# Patient Record
Sex: FEMALE | Race: WHITE
[De-identification: names, ages, dates, MRNs, and addresses within clinical notes are randomized per-mention and may not be internally consistent; named-entity substitution may affect disease eponyms.]

---

## 2017-05-03 ENCOUNTER — HOSPITAL ENCOUNTER (OUTPATIENT)
Dept: HOSPITAL 53 - M SFHCLERA | Age: 32
End: 2017-05-03
Attending: NURSE PRACTITIONER
Payer: COMMERCIAL

## 2017-05-03 DIAGNOSIS — R30.0: Primary | ICD-10-CM

## 2017-05-03 LAB — INTERNAL CH/GC CONTROL: (no result)

## 2017-05-13 ENCOUNTER — HOSPITAL ENCOUNTER (OUTPATIENT)
Dept: HOSPITAL 53 - M LAB | Age: 32
End: 2017-05-13
Attending: PHYSICIAN ASSISTANT
Payer: COMMERCIAL

## 2017-05-13 DIAGNOSIS — R53.83: ICD-10-CM

## 2017-05-13 DIAGNOSIS — D64.9: Primary | ICD-10-CM

## 2017-05-13 LAB
ALBUMIN SERPL BCG-MCNC: 4.1 GM/DL (ref 3.2–5.2)
ALBUMIN/GLOB SERPL: 1.17 {RATIO} (ref 1–1.93)
ALP SERPL-CCNC: 40 U/L (ref 45–117)
ALT SERPL W P-5'-P-CCNC: 17 U/L (ref 12–78)
ANION GAP SERPL CALC-SCNC: 7 MEQ/L (ref 8–16)
AST SERPL-CCNC: 12 U/L (ref 15–37)
BASOPHILS # BLD AUTO: 0.1 K/MM3 (ref 0–0.2)
BASOPHILS NFR BLD AUTO: 1.1 % (ref 0–1)
BILIRUB SERPL-MCNC: 0.4 MG/DL (ref 0.2–1)
BUN SERPL-MCNC: 12 MG/DL (ref 7–18)
CALCIUM SERPL-MCNC: 8.8 MG/DL (ref 8.5–10.1)
CHLORIDE SERPL-SCNC: 105 MEQ/L (ref 98–107)
CO2 SERPL-SCNC: 29 MEQ/L (ref 21–32)
CREAT SERPL-MCNC: 0.86 MG/DL (ref 0.55–1.02)
EOSINOPHIL # BLD AUTO: 0 K/MM3 (ref 0–0.5)
EOSINOPHIL NFR BLD AUTO: 1.1 % (ref 0–3)
ERYTHROCYTE [DISTWIDTH] IN BLOOD BY AUTOMATED COUNT: 12.2 % (ref 11.5–14.5)
GFR SERPL CREATININE-BSD FRML MDRD: > 60 ML/MIN/{1.73_M2} (ref 60–?)
GLUCOSE SERPL-MCNC: 76 MG/DL (ref 70–105)
LARGE UNSTAINED CELL #: 0.1 K/MM3 (ref 0–0.4)
LARGE UNSTAINED CELL %: 1.6 % (ref 0–4)
LYMPHOCYTES # BLD AUTO: 1.4 K/MM3 (ref 1.5–6.5)
LYMPHOCYTES NFR BLD AUTO: 28.1 % (ref 24–44)
MCH RBC QN AUTO: 32.7 PG (ref 27–33)
MCHC RBC AUTO-ENTMCNC: 33.7 G/DL (ref 32–36.5)
MCV RBC AUTO: 97 FL (ref 80–96)
MONOCYTES # BLD AUTO: 0.3 K/MM3 (ref 0–0.8)
MONOCYTES NFR BLD AUTO: 5 % (ref 0–5)
NEUTROPHILS # BLD AUTO: 3.2 K/MM3 (ref 1.8–7.7)
NEUTROPHILS NFR BLD AUTO: 63 % (ref 36–66)
PLATELET # BLD AUTO: 179 K/MM3 (ref 150–450)
POTASSIUM SERPL-SCNC: 4.6 MEQ/L (ref 3.5–5.1)
PROT SERPL-MCNC: 7.6 GM/DL (ref 6.4–8.2)
SODIUM SERPL-SCNC: 141 MEQ/L (ref 136–145)
WBC # BLD AUTO: 5 K/MM3 (ref 4–10)

## 2017-09-26 ENCOUNTER — HOSPITAL ENCOUNTER (OUTPATIENT)
Dept: HOSPITAL 53 - M LAB | Age: 32
End: 2017-09-26
Attending: ADVANCED PRACTICE MIDWIFE
Payer: COMMERCIAL

## 2017-09-26 DIAGNOSIS — Z34.81: Primary | ICD-10-CM

## 2017-09-26 LAB
BASOPHILS # BLD AUTO: 0 10^3/UL (ref 0–0.2)
BASOPHILS NFR BLD AUTO: 0.6 % (ref 0–1)
EOSINOPHIL # BLD AUTO: 0 10^3/UL (ref 0–0.5)
EOSINOPHIL NFR BLD AUTO: 0.6 % (ref 0–3)
ERYTHROCYTE [DISTWIDTH] IN BLOOD BY AUTOMATED COUNT: 11.9 % (ref 11.5–14.5)
IMM GRANULOCYTES NFR BLD: 0.3 % (ref 0–0)
LYMPHOCYTES # BLD AUTO: 1.5 10^3/UL (ref 1.5–6.5)
LYMPHOCYTES NFR BLD AUTO: 22.3 % (ref 24–44)
MCH RBC QN AUTO: 31.9 PG (ref 27–33)
MCHC RBC AUTO-ENTMCNC: 33.8 G/DL (ref 32–36.5)
MCV RBC AUTO: 94.5 FL (ref 80–96)
MONOCYTES # BLD AUTO: 0.4 10^3/UL (ref 0–0.8)
MONOCYTES NFR BLD AUTO: 6.2 % (ref 0–5)
NEUTROPHILS # BLD AUTO: 4.7 10^3/UL (ref 1.8–7.7)
NEUTROPHILS NFR BLD AUTO: 70 % (ref 36–66)
NRBC BLD AUTO-RTO: 0 % (ref 0–0)
PLATELET # BLD AUTO: 170 10^3/UL (ref 150–450)
WBC # BLD AUTO: 6.7 10^3/UL (ref 4–10)

## 2017-09-27 LAB — HBSAG PRENATAL: NEGATIVE

## 2017-12-08 ENCOUNTER — HOSPITAL ENCOUNTER (OUTPATIENT)
Dept: HOSPITAL 53 - M LAB | Age: 32
End: 2017-12-08
Attending: ADVANCED PRACTICE MIDWIFE
Payer: COMMERCIAL

## 2017-12-08 DIAGNOSIS — Z31.438: Primary | ICD-10-CM

## 2017-12-21 ENCOUNTER — HOSPITAL ENCOUNTER (OUTPATIENT)
Dept: HOSPITAL 53 - M RAD | Age: 32
End: 2017-12-21
Attending: ADVANCED PRACTICE MIDWIFE
Payer: COMMERCIAL

## 2017-12-21 DIAGNOSIS — Z3A.19: ICD-10-CM

## 2017-12-21 DIAGNOSIS — Z34.82: Primary | ICD-10-CM

## 2017-12-22 NOTE — REP
COMPLETE OBSTETRICAL ULTRASOUND:

 

CLINICAL:  Anatomical evaluation.

 

COMPARISON:  None.

 

FINDINGS:  Ultrasound examination demonstrates single live intrauterine pregnancy

in transverse lie with head towards the maternal left side.  Fetal motion was

identified by technologist.  Placenta is noted anteriorly and grade 0 without

evidence for placenta previa or abruption.  Amniotic fluid volume is within

normal limits.  Cervix measures 5.5 cm in length and appears closed.

 

Gestational age by LMP and current measurements 19 weeks 5 days with estimated

date of delivery 05/12/2018.

 

 beats per minute.

 

BPD 4.8 cm 20 weeks 3 days

 

HC 17.2 cm = 19 weeks 6 days

 

AC 14.5 cm 19 weeks 6 days

 

FL 3.1 cm 19 weeks 5 days

 

HL 3.1 cm 20 weeks 3 days

 

HC/AC ratio 1.19.  Estimated fetal weight 350 grams (51st percentile).

 

Anatomical assessment demonstrates normal cranium, choroid plexus, cavum,

cerebellum/posterior fossa, facial features, lungs, four chamber

heart/ventricular outflow tracts, diaphragm, stomach, cord insertion/three vessel

cord, kidneys/bladder and extremities.  Suboptimal evaluation of the spine due to

positioning.

 

IMPRESSION:

 

Single live intrauterine pregnancy in transverse lie.  Limited evaluation of the

spine may warrant re-evaluation.  Remainder of the anatomical assessment is

complete and normal.  No gross abnormalities are identified.

 

 

Signed by

Ryan Luis MD 12/23/2017 08:43 A

## 2018-01-25 ENCOUNTER — HOSPITAL ENCOUNTER (OUTPATIENT)
Dept: HOSPITAL 53 - M RAD | Age: 33
End: 2018-01-25
Attending: ADVANCED PRACTICE MIDWIFE
Payer: COMMERCIAL

## 2018-01-25 DIAGNOSIS — Z34.82: Primary | ICD-10-CM

## 2018-01-25 DIAGNOSIS — Z3A.24: ICD-10-CM

## 2018-01-25 PROCEDURE — 76816 OB US FOLLOW-UP PER FETUS: CPT

## 2018-02-16 ENCOUNTER — HOSPITAL ENCOUNTER (OUTPATIENT)
Dept: HOSPITAL 53 - M SMT | Age: 33
End: 2018-02-16
Attending: ADVANCED PRACTICE MIDWIFE
Payer: COMMERCIAL

## 2018-02-16 DIAGNOSIS — Z34.82: Primary | ICD-10-CM

## 2018-02-16 LAB
GLUCOSE CHALLENGE TEST 1 HOUR: 128 MG/DL (ref ?–140)
HEMATOCRIT: 36.7 % (ref 36–47)
HEMOGLOBIN: 12.4 G/DL (ref 12–16)
MEAN CORPUSCULAR HEMOGLOBIN: 33.3 PG (ref 27–33)
MEAN CORPUSCULAR HGB CONC: 33.8 G/DL (ref 32–36.5)
MEAN CORPUSCULAR VOLUME: 98.7 FL (ref 80–96)
NRBC BLD AUTO-RTO: 0 % (ref 0–0)
PLATELET COUNT, AUTOMATED: 157 10^3/UL (ref 150–450)
RED BLOOD COUNT: 3.72 10^6/UL (ref 4–5.4)
RED CELL DISTRIBUTION WIDTH: 12.2 % (ref 11.5–14.5)
WHITE BLOOD COUNT: 8 10^3/UL (ref 4–10)

## 2018-04-18 ENCOUNTER — HOSPITAL ENCOUNTER (OUTPATIENT)
Dept: HOSPITAL 53 - M LAB REF | Age: 33
End: 2018-04-18
Attending: ADVANCED PRACTICE MIDWIFE
Payer: COMMERCIAL

## 2018-04-18 DIAGNOSIS — Z34.83: Primary | ICD-10-CM

## 2018-04-18 PROCEDURE — 87081 CULTURE SCREEN ONLY: CPT

## 2018-05-18 ENCOUNTER — HOSPITAL ENCOUNTER (INPATIENT)
Dept: HOSPITAL 53 - M LDI | Age: 33
LOS: 2 days | Discharge: HOME | End: 2018-05-20
Payer: COMMERCIAL

## 2018-05-18 DIAGNOSIS — O48.0: ICD-10-CM

## 2018-05-18 DIAGNOSIS — Z3A.40: ICD-10-CM

## 2018-05-18 DIAGNOSIS — O34.211: Primary | ICD-10-CM

## 2018-05-18 DIAGNOSIS — Z30.2: ICD-10-CM

## 2018-05-18 LAB
HEMATOCRIT: 35.5 % (ref 36–47)
HEMOGLOBIN: 12.5 G/DL (ref 12–15.5)
MEAN CORPUSCULAR HEMOGLOBIN: 34.3 PG (ref 27–33)
MEAN CORPUSCULAR HGB CONC: 35.2 G/DL (ref 32–36.5)
MEAN CORPUSCULAR VOLUME: 97.5 FL (ref 80–96)
NRBC BLD AUTO-RTO: 0 % (ref 0–0)
PLATELET COUNT, AUTOMATED: 118 10^3/UL (ref 150–450)
RED BLOOD COUNT: 3.64 10^6/UL (ref 4–5.4)
RED CELL DISTRIBUTION WIDTH: 12.8 % (ref 11.5–14.5)
SYPHILIS: NONREACTIVE
WHITE BLOOD COUNT: 7.9 10^3/UL (ref 4–10)

## 2018-05-18 PROCEDURE — 0UB70ZZ EXCISION OF BILATERAL FALLOPIAN TUBES, OPEN APPROACH: ICD-10-PCS

## 2018-05-18 RX ADMIN — SODIUM CHLORIDE, POTASSIUM CHLORIDE, SODIUM LACTATE AND CALCIUM CHLORIDE 1 MLS/HR: 600; 310; 30; 20 INJECTION, SOLUTION INTRAVENOUS at 07:27

## 2018-05-18 RX ADMIN — SODIUM CHLORIDE, POTASSIUM CHLORIDE, SODIUM LACTATE AND CALCIUM CHLORIDE 1 MLS/HR: 600; 310; 30; 20 INJECTION, SOLUTION INTRAVENOUS at 06:07

## 2018-05-18 RX ADMIN — KETOROLAC TROMETHAMINE 1 MG: 30 INJECTION, SOLUTION INTRAMUSCULAR at 20:07

## 2018-05-18 RX ADMIN — KETOROLAC TROMETHAMINE 1 MG: 30 INJECTION, SOLUTION INTRAMUSCULAR at 14:11

## 2018-05-18 RX ADMIN — SODIUM CITRATE AND CITRIC ACID MONOHYDRATE 1 ML: 500; 334 SOLUTION ORAL at 07:26

## 2018-05-18 RX ADMIN — SODIUM CHLORIDE, POTASSIUM CHLORIDE, SODIUM LACTATE AND CALCIUM CHLORIDE 1 MLS/HR: 600; 310; 30; 20 INJECTION, SOLUTION INTRAVENOUS at 17:15

## 2018-05-18 RX ADMIN — SODIUM CHLORIDE, POTASSIUM CHLORIDE, SODIUM LACTATE AND CALCIUM CHLORIDE 1 MLS/HR: 600; 310; 30; 20 INJECTION, SOLUTION INTRAVENOUS at 10:33

## 2018-05-18 RX ADMIN — Medication 1 TAB: at 10:25

## 2018-05-18 RX ADMIN — DOCUSATE SODIUM 1 MG: 100 CAPSULE, LIQUID FILLED ORAL at 20:07

## 2018-05-19 LAB
HEMATOCRIT: 30.5 % (ref 36–47)
HEMOGLOBIN: 10.2 G/DL (ref 12–15.5)
MEAN CORPUSCULAR HEMOGLOBIN: 33.2 PG (ref 27–33)
MEAN CORPUSCULAR HGB CONC: 33.4 G/DL (ref 32–36.5)
MEAN CORPUSCULAR VOLUME: 99.3 FL (ref 80–96)
NRBC BLD AUTO-RTO: 0 % (ref 0–0)
PLATELET COUNT, AUTOMATED: 103 10^3/UL (ref 150–450)
RED BLOOD COUNT: 3.07 10^6/UL (ref 4–5.4)
RED CELL DISTRIBUTION WIDTH: 13.2 % (ref 11.5–14.5)
WHITE BLOOD COUNT: 8.8 10^3/UL (ref 4–10)

## 2018-05-19 RX ADMIN — IBUPROFEN 1 MG: 800 TABLET, FILM COATED ORAL at 16:39

## 2018-05-19 RX ADMIN — Medication 1 TAB: at 08:35

## 2018-05-19 RX ADMIN — KETOROLAC TROMETHAMINE 1 MG: 30 INJECTION, SOLUTION INTRAMUSCULAR at 02:12

## 2018-05-19 RX ADMIN — KETOROLAC TROMETHAMINE 1 MG: 30 INJECTION, SOLUTION INTRAMUSCULAR at 08:36

## 2018-05-19 RX ADMIN — SODIUM CHLORIDE, POTASSIUM CHLORIDE, SODIUM LACTATE AND CALCIUM CHLORIDE 1 MLS/HR: 600; 310; 30; 20 INJECTION, SOLUTION INTRAVENOUS at 01:15

## 2018-05-19 RX ADMIN — DOCUSATE SODIUM 1 MG: 100 CAPSULE, LIQUID FILLED ORAL at 21:16

## 2018-05-20 RX ADMIN — Medication 1 TAB: at 08:30

## 2018-05-20 RX ADMIN — IBUPROFEN 1 MG: 800 TABLET, FILM COATED ORAL at 03:41

## 2018-05-20 RX ADMIN — IBUPROFEN 1 MG: 800 TABLET, FILM COATED ORAL at 08:30

## 2023-07-18 ENCOUNTER — HOSPITAL ENCOUNTER (EMERGENCY)
Dept: HOSPITAL 53 - M ED | Age: 38
Discharge: HOME | End: 2023-07-18
Payer: COMMERCIAL

## 2023-07-18 VITALS — DIASTOLIC BLOOD PRESSURE: 79 MMHG | SYSTOLIC BLOOD PRESSURE: 124 MMHG | OXYGEN SATURATION: 99 % | TEMPERATURE: 98.1 F

## 2023-07-18 VITALS — BODY MASS INDEX: 27.3 KG/M2 | WEIGHT: 173.94 LBS | HEIGHT: 67 IN
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
## 2023-07-18 DIAGNOSIS — J45.909: ICD-10-CM

## 2023-07-18 DIAGNOSIS — S81.831A: Primary | ICD-10-CM
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
## 2023-07-25 ENCOUNTER — HOSPITAL ENCOUNTER (EMERGENCY)
Dept: HOSPITAL 53 - M ED | Age: 38
Discharge: HOME | End: 2023-07-25
Payer: COMMERCIAL

## 2023-07-25 VITALS
DIASTOLIC BLOOD PRESSURE: 71 MMHG | OXYGEN SATURATION: 100 % | HEIGHT: 67 IN | WEIGHT: 177.03 LBS | BODY MASS INDEX: 27.79 KG/M2 | SYSTOLIC BLOOD PRESSURE: 114 MMHG | TEMPERATURE: 97.3 F

## 2023-07-25 DIAGNOSIS — Z29.14: Primary | ICD-10-CM

## 2023-08-01 ENCOUNTER — HOSPITAL ENCOUNTER (EMERGENCY)
Dept: HOSPITAL 53 - M ED | Age: 38
Discharge: HOME | End: 2023-08-01
Payer: COMMERCIAL

## 2023-08-01 VITALS
TEMPERATURE: 98.3 F | OXYGEN SATURATION: 100 % | HEIGHT: 67 IN | WEIGHT: 177.25 LBS | SYSTOLIC BLOOD PRESSURE: 126 MMHG | BODY MASS INDEX: 27.82 KG/M2 | DIASTOLIC BLOOD PRESSURE: 76 MMHG

## 2023-08-01 DIAGNOSIS — Z29.14: Primary | ICD-10-CM
